# Patient Record
Sex: FEMALE | Race: WHITE | NOT HISPANIC OR LATINO | Employment: OTHER | URBAN - METROPOLITAN AREA
[De-identification: names, ages, dates, MRNs, and addresses within clinical notes are randomized per-mention and may not be internally consistent; named-entity substitution may affect disease eponyms.]

---

## 2017-01-10 ENCOUNTER — HOSPITAL ENCOUNTER (INPATIENT)
Facility: HOSPITAL | Age: 82
LOS: 9 days | Discharge: NURSING, REHAB OR LONG-TERM CARE | DRG: 871 | End: 2017-01-19
Attending: EMERGENCY MEDICINE | Admitting: INTERNAL MEDICINE
Payer: MEDICARE

## 2017-01-10 ENCOUNTER — APPOINTMENT (EMERGENCY)
Dept: RADIOLOGY | Facility: HOSPITAL | Age: 82
DRG: 871 | End: 2017-01-10
Payer: MEDICARE

## 2017-01-10 DIAGNOSIS — G93.40 ENCEPHALOPATHY: Primary | ICD-10-CM

## 2017-01-10 DIAGNOSIS — A04.72 C. DIFFICILE COLITIS: ICD-10-CM

## 2017-01-10 DIAGNOSIS — F32.A DEPRESSION: Chronic | ICD-10-CM

## 2017-01-10 DIAGNOSIS — R19.7 DIARRHEA: ICD-10-CM

## 2017-01-10 DIAGNOSIS — N17.9 AKI (ACUTE KIDNEY INJURY) (HCC): ICD-10-CM

## 2017-01-10 DIAGNOSIS — R13.10 DYSPHAGIA: ICD-10-CM

## 2017-01-10 DIAGNOSIS — A04.72 C. DIFFICILE DIARRHEA: ICD-10-CM

## 2017-01-10 DIAGNOSIS — F03.90 DEMENTIA (HCC): ICD-10-CM

## 2017-01-10 LAB
ALBUMIN SERPL BCP-MCNC: 2.3 G/DL (ref 3.5–5)
ALP SERPL-CCNC: 71 U/L (ref 46–116)
ALT SERPL W P-5'-P-CCNC: 29 U/L (ref 12–78)
ANION GAP SERPL CALCULATED.3IONS-SCNC: 8 MMOL/L (ref 4–13)
AST SERPL W P-5'-P-CCNC: 45 U/L (ref 5–45)
ATRIAL RATE: 178 BPM
BACTERIA UR QL AUTO: ABNORMAL /HPF
BASE EX.OXY STD BLDV CALC-SCNC: 41.8 % (ref 60–80)
BASE EXCESS BLDV CALC-SCNC: 1.6 MMOL/L
BASOPHILS # BLD AUTO: 0.02 THOUSANDS/ΜL (ref 0–0.1)
BASOPHILS NFR BLD AUTO: 0 % (ref 0–1)
BILIRUB SERPL-MCNC: 0.28 MG/DL (ref 0.2–1)
BILIRUB UR QL STRIP: NEGATIVE
BUN SERPL-MCNC: 32 MG/DL (ref 5–25)
CALCIUM SERPL-MCNC: 9.1 MG/DL (ref 8.3–10.1)
CHLORIDE SERPL-SCNC: 97 MMOL/L (ref 100–108)
CLARITY UR: CLEAR
CLARITY, POC: CLEAR
CO2 SERPL-SCNC: 28 MMOL/L (ref 21–32)
COLOR UR: YELLOW
COLOR, POC: YELLOW
CREAT SERPL-MCNC: 1.29 MG/DL (ref 0.6–1.3)
EOSINOPHIL # BLD AUTO: 0 THOUSAND/ΜL (ref 0–0.61)
EOSINOPHIL NFR BLD AUTO: 0 % (ref 0–6)
ERYTHROCYTE [DISTWIDTH] IN BLOOD BY AUTOMATED COUNT: 15.5 % (ref 11.6–15.1)
GFR SERPL CREATININE-BSD FRML MDRD: 38.9 ML/MIN/1.73SQ M
GLUCOSE SERPL-MCNC: 112 MG/DL (ref 65–140)
GLUCOSE UR STRIP-MCNC: NEGATIVE MG/DL
HCO3 BLDV-SCNC: 27.1 MMOL/L (ref 24–30)
HCT VFR BLD AUTO: 32.4 % (ref 34.8–46.1)
HGB BLD-MCNC: 10.8 G/DL (ref 11.5–15.4)
HGB UR QL STRIP.AUTO: ABNORMAL
HYALINE CASTS #/AREA URNS LPF: ABNORMAL /LPF
KETONES UR STRIP-MCNC: NEGATIVE MG/DL
LACTATE SERPL-SCNC: 1.4 MMOL/L (ref 0.5–2)
LEUKOCYTE ESTERASE UR QL STRIP: ABNORMAL
LIPASE SERPL-CCNC: 58 U/L (ref 73–393)
LYMPHOCYTES # BLD AUTO: 0.92 THOUSANDS/ΜL (ref 0.6–4.47)
LYMPHOCYTES NFR BLD AUTO: 5 % (ref 14–44)
MCH RBC QN AUTO: 30.5 PG (ref 26.8–34.3)
MCHC RBC AUTO-ENTMCNC: 33.3 G/DL (ref 31.4–37.4)
MCV RBC AUTO: 92 FL (ref 82–98)
MONOCYTES # BLD AUTO: 1.34 THOUSAND/ΜL (ref 0.17–1.22)
MONOCYTES NFR BLD AUTO: 7 % (ref 4–12)
NEUTROPHILS # BLD AUTO: 15.83 THOUSANDS/ΜL (ref 1.85–7.62)
NEUTS SEG NFR BLD AUTO: 88 % (ref 43–75)
NITRITE UR QL STRIP: POSITIVE
NON-SQ EPI CELLS URNS QL MICRO: ABNORMAL /HPF
NRBC BLD AUTO-RTO: 0 /100 WBCS
O2 CT BLDV-SCNC: 6.8 ML/DL
PCO2 BLDV: 46.2 MM HG (ref 42–50)
PH BLDV: 7.39 [PH] (ref 7.3–7.4)
PH UR STRIP.AUTO: 5.5 [PH] (ref 4.5–8)
PLATELET # BLD AUTO: 172 THOUSANDS/UL (ref 149–390)
PMV BLD AUTO: 12.1 FL (ref 8.9–12.7)
PO2 BLDV: 26.1 MM HG (ref 35–45)
POTASSIUM SERPL-SCNC: 4.3 MMOL/L (ref 3.5–5.3)
PROT SERPL-MCNC: 7.4 G/DL (ref 6.4–8.2)
PROT UR STRIP-MCNC: ABNORMAL MG/DL
QRS AXIS: -13 DEGREES
QRSD INTERVAL: 86 MS
QT INTERVAL: 408 MS
QTC INTERVAL: 446 MS
RBC # BLD AUTO: 3.54 MILLION/UL (ref 3.81–5.12)
RBC #/AREA URNS AUTO: ABNORMAL /HPF
SODIUM SERPL-SCNC: 133 MMOL/L (ref 136–145)
SP GR UR STRIP.AUTO: 1.02 (ref 1–1.03)
SPECIMEN SOURCE: NORMAL
T WAVE AXIS: 49 DEGREES
TROPONIN I BLD-MCNC: 0 NG/ML (ref 0–0.08)
UROBILINOGEN UR QL STRIP.AUTO: 0.2 E.U./DL
VENTRICULAR RATE: 72 BPM
WBC # BLD AUTO: 18.24 THOUSAND/UL (ref 4.31–10.16)
WBC #/AREA URNS AUTO: ABNORMAL /HPF

## 2017-01-10 PROCEDURE — 87086 URINE CULTURE/COLONY COUNT: CPT

## 2017-01-10 PROCEDURE — 74000 HB X-RAY EXAM OF ABDOMEN (SINGLE ANTEROPOSTERIOR VIEW): CPT

## 2017-01-10 PROCEDURE — 83605 ASSAY OF LACTIC ACID: CPT | Performed by: EMERGENCY MEDICINE

## 2017-01-10 PROCEDURE — 96360 HYDRATION IV INFUSION INIT: CPT

## 2017-01-10 PROCEDURE — 81001 URINALYSIS AUTO W/SCOPE: CPT

## 2017-01-10 PROCEDURE — C9113 INJ PANTOPRAZOLE SODIUM, VIA: HCPCS | Performed by: PHYSICIAN ASSISTANT

## 2017-01-10 PROCEDURE — 74176 CT ABD & PELVIS W/O CONTRAST: CPT

## 2017-01-10 PROCEDURE — 70450 CT HEAD/BRAIN W/O DYE: CPT

## 2017-01-10 PROCEDURE — 85025 COMPLETE CBC W/AUTO DIFF WBC: CPT | Performed by: EMERGENCY MEDICINE

## 2017-01-10 PROCEDURE — 87077 CULTURE AEROBIC IDENTIFY: CPT

## 2017-01-10 PROCEDURE — 82805 BLOOD GASES W/O2 SATURATION: CPT | Performed by: EMERGENCY MEDICINE

## 2017-01-10 PROCEDURE — 80053 COMPREHEN METABOLIC PANEL: CPT | Performed by: EMERGENCY MEDICINE

## 2017-01-10 PROCEDURE — 83690 ASSAY OF LIPASE: CPT | Performed by: EMERGENCY MEDICINE

## 2017-01-10 PROCEDURE — 81002 URINALYSIS NONAUTO W/O SCOPE: CPT | Performed by: EMERGENCY MEDICINE

## 2017-01-10 PROCEDURE — 96361 HYDRATE IV INFUSION ADD-ON: CPT

## 2017-01-10 PROCEDURE — 99285 EMERGENCY DEPT VISIT HI MDM: CPT

## 2017-01-10 PROCEDURE — 93005 ELECTROCARDIOGRAM TRACING: CPT

## 2017-01-10 PROCEDURE — 96372 THER/PROPH/DIAG INJ SC/IM: CPT

## 2017-01-10 PROCEDURE — 84484 ASSAY OF TROPONIN QUANT: CPT

## 2017-01-10 PROCEDURE — 36415 COLL VENOUS BLD VENIPUNCTURE: CPT | Performed by: EMERGENCY MEDICINE

## 2017-01-10 PROCEDURE — 87186 SC STD MICRODIL/AGAR DIL: CPT

## 2017-01-10 RX ORDER — CARVEDILOL 12.5 MG/1
12.5 TABLET ORAL 2 TIMES DAILY WITH MEALS
Status: DISCONTINUED | OUTPATIENT
Start: 2017-01-11 | End: 2017-01-19 | Stop reason: HOSPADM

## 2017-01-10 RX ORDER — BRIMONIDINE TARTRATE 0.15 %
1 DROPS OPHTHALMIC (EYE) 2 TIMES DAILY
Status: DISCONTINUED | OUTPATIENT
Start: 2017-01-10 | End: 2017-01-19 | Stop reason: HOSPADM

## 2017-01-10 RX ORDER — OMEPRAZOLE 20 MG/1
20 CAPSULE, DELAYED RELEASE ORAL DAILY
COMMUNITY

## 2017-01-10 RX ORDER — CLOPIDOGREL BISULFATE 75 MG/1
75 TABLET ORAL DAILY
Status: DISCONTINUED | OUTPATIENT
Start: 2017-01-11 | End: 2017-01-19 | Stop reason: HOSPADM

## 2017-01-10 RX ORDER — HALOPERIDOL 5 MG/ML
2 INJECTION INTRAMUSCULAR ONCE
Status: COMPLETED | OUTPATIENT
Start: 2017-01-10 | End: 2017-01-10

## 2017-01-10 RX ORDER — LEVOTHYROXINE SODIUM 0.07 MG/1
75 TABLET ORAL
Status: DISCONTINUED | OUTPATIENT
Start: 2017-01-11 | End: 2017-01-19 | Stop reason: HOSPADM

## 2017-01-10 RX ORDER — QUETIAPINE FUMARATE 25 MG/1
12.5 TABLET, FILM COATED ORAL
COMMUNITY

## 2017-01-10 RX ORDER — PRAVASTATIN SODIUM 80 MG/1
80 TABLET ORAL
Status: DISCONTINUED | OUTPATIENT
Start: 2017-01-11 | End: 2017-01-19 | Stop reason: HOSPADM

## 2017-01-10 RX ORDER — LACTOSE-REDUCED FOOD/FIBER 0.06 G-1.5
LIQUID (ML) ORAL
COMMUNITY

## 2017-01-10 RX ORDER — QUETIAPINE FUMARATE 25 MG/1
12.5 TABLET, FILM COATED ORAL
Status: DISCONTINUED | OUTPATIENT
Start: 2017-01-10 | End: 2017-01-19 | Stop reason: HOSPADM

## 2017-01-10 RX ORDER — VALPROIC ACID 250 MG/5ML
2.5 SOLUTION ORAL 2 TIMES DAILY
COMMUNITY

## 2017-01-10 RX ORDER — GABAPENTIN 100 MG/1
200 CAPSULE ORAL 2 TIMES DAILY
Status: DISCONTINUED | OUTPATIENT
Start: 2017-01-10 | End: 2017-01-19 | Stop reason: HOSPADM

## 2017-01-10 RX ORDER — ACETAMINOPHEN 160 MG/5ML
650 SUSPENSION, ORAL (FINAL DOSE FORM) ORAL EVERY 4 HOURS PRN
Status: DISCONTINUED | OUTPATIENT
Start: 2017-01-10 | End: 2017-01-19 | Stop reason: HOSPADM

## 2017-01-10 RX ORDER — AMLODIPINE BESYLATE 5 MG/1
5 TABLET ORAL DAILY
Status: DISCONTINUED | OUTPATIENT
Start: 2017-01-11 | End: 2017-01-19 | Stop reason: HOSPADM

## 2017-01-10 RX ORDER — HALOPERIDOL 5 MG/ML
1 INJECTION INTRAMUSCULAR EVERY 6 HOURS PRN
Status: DISCONTINUED | OUTPATIENT
Start: 2017-01-10 | End: 2017-01-11

## 2017-01-10 RX ORDER — LORATADINE 10 MG/1
5 TABLET ORAL DAILY
Status: DISCONTINUED | OUTPATIENT
Start: 2017-01-11 | End: 2017-01-19 | Stop reason: HOSPADM

## 2017-01-10 RX ORDER — PRAVASTATIN SODIUM 80 MG/1
80 TABLET ORAL
Status: DISCONTINUED | OUTPATIENT
Start: 2017-01-11 | End: 2017-01-10

## 2017-01-10 RX ORDER — PANTOPRAZOLE SODIUM 40 MG/1
40 INJECTION, POWDER, FOR SOLUTION INTRAVENOUS EVERY 12 HOURS SCHEDULED
Status: DISCONTINUED | OUTPATIENT
Start: 2017-01-10 | End: 2017-01-19 | Stop reason: HOSPADM

## 2017-01-10 RX ORDER — ONDANSETRON 2 MG/ML
4 INJECTION INTRAMUSCULAR; INTRAVENOUS EVERY 6 HOURS PRN
Status: DISCONTINUED | OUTPATIENT
Start: 2017-01-10 | End: 2017-01-19 | Stop reason: HOSPADM

## 2017-01-10 RX ORDER — ESCITALOPRAM OXALATE 10 MG/1
10 TABLET ORAL DAILY
Status: DISCONTINUED | OUTPATIENT
Start: 2017-01-11 | End: 2017-01-19 | Stop reason: HOSPADM

## 2017-01-10 RX ADMIN — HALOPERIDOL LACTATE 2 MG: 5 INJECTION, SOLUTION INTRAMUSCULAR at 12:43

## 2017-01-10 RX ADMIN — PANTOPRAZOLE SODIUM 40 MG: 40 INJECTION, POWDER, FOR SOLUTION INTRAVENOUS at 20:46

## 2017-01-10 RX ADMIN — VANCOMYCIN HYDROCHLORIDE 125 MG: 500 INJECTION, POWDER, LYOPHILIZED, FOR SOLUTION INTRAVENOUS at 23:36

## 2017-01-10 RX ADMIN — HALOPERIDOL LACTATE 2 MG: 5 INJECTION, SOLUTION INTRAMUSCULAR at 13:30

## 2017-01-10 RX ADMIN — SODIUM CHLORIDE 1000 ML: 0.9 INJECTION, SOLUTION INTRAVENOUS at 12:20

## 2017-01-10 RX ADMIN — SODIUM CHLORIDE 500 ML: 0.9 INJECTION, SOLUTION INTRAVENOUS at 19:00

## 2017-01-10 RX ADMIN — VANCOMYCIN HYDROCHLORIDE 125 MG: 500 INJECTION, POWDER, LYOPHILIZED, FOR SOLUTION INTRAVENOUS at 13:11

## 2017-01-10 RX ADMIN — QUETIAPINE FUMARATE 12.5 MG: 25 TABLET, FILM COATED ORAL at 23:36

## 2017-01-10 RX ADMIN — CEFTRIAXONE 1000 MG: 1 INJECTION, POWDER, FOR SOLUTION INTRAMUSCULAR; INTRAVENOUS at 20:46

## 2017-01-10 RX ADMIN — VANCOMYCIN HYDROCHLORIDE 125 MG: 500 INJECTION, POWDER, LYOPHILIZED, FOR SOLUTION INTRAVENOUS at 19:00

## 2017-01-11 ENCOUNTER — APPOINTMENT (INPATIENT)
Dept: RADIOLOGY | Facility: HOSPITAL | Age: 82
DRG: 871 | End: 2017-01-11
Attending: HOSPITALIST
Payer: MEDICARE

## 2017-01-11 LAB
ALBUMIN SERPL BCP-MCNC: 2.1 G/DL (ref 3.5–5)
ALP SERPL-CCNC: 75 U/L (ref 46–116)
ALT SERPL W P-5'-P-CCNC: 23 U/L (ref 12–78)
ANION GAP SERPL CALCULATED.3IONS-SCNC: 6 MMOL/L (ref 4–13)
AST SERPL W P-5'-P-CCNC: 25 U/L (ref 5–45)
BILIRUB SERPL-MCNC: 0.23 MG/DL (ref 0.2–1)
BUN SERPL-MCNC: 25 MG/DL (ref 5–25)
CALCIUM SERPL-MCNC: 8.6 MG/DL (ref 8.3–10.1)
CHLORIDE SERPL-SCNC: 100 MMOL/L (ref 100–108)
CO2 SERPL-SCNC: 29 MMOL/L (ref 21–32)
CREAT SERPL-MCNC: 0.71 MG/DL (ref 0.6–1.3)
ERYTHROCYTE [DISTWIDTH] IN BLOOD BY AUTOMATED COUNT: 15.6 % (ref 11.6–15.1)
GFR SERPL CREATININE-BSD FRML MDRD: >60 ML/MIN/1.73SQ M
GLUCOSE SERPL-MCNC: 91 MG/DL (ref 65–140)
HCT VFR BLD AUTO: 31 % (ref 34.8–46.1)
HGB BLD-MCNC: 9.9 G/DL (ref 11.5–15.4)
MAGNESIUM SERPL-MCNC: 2.3 MG/DL (ref 1.6–2.6)
MCH RBC QN AUTO: 29.3 PG (ref 26.8–34.3)
MCHC RBC AUTO-ENTMCNC: 31.9 G/DL (ref 31.4–37.4)
MCV RBC AUTO: 92 FL (ref 82–98)
PLATELET # BLD AUTO: 164 THOUSANDS/UL (ref 149–390)
PMV BLD AUTO: 12.8 FL (ref 8.9–12.7)
POTASSIUM SERPL-SCNC: 3.5 MMOL/L (ref 3.5–5.3)
PROT SERPL-MCNC: 6.7 G/DL (ref 6.4–8.2)
RBC # BLD AUTO: 3.38 MILLION/UL (ref 3.81–5.12)
SODIUM SERPL-SCNC: 135 MMOL/L (ref 136–145)
WBC # BLD AUTO: 15.9 THOUSAND/UL (ref 4.31–10.16)

## 2017-01-11 PROCEDURE — C9113 INJ PANTOPRAZOLE SODIUM, VIA: HCPCS | Performed by: PHYSICIAN ASSISTANT

## 2017-01-11 PROCEDURE — 85027 COMPLETE CBC AUTOMATED: CPT | Performed by: PHYSICIAN ASSISTANT

## 2017-01-11 PROCEDURE — 94760 N-INVAS EAR/PLS OXIMETRY 1: CPT

## 2017-01-11 PROCEDURE — 80053 COMPREHEN METABOLIC PANEL: CPT | Performed by: PHYSICIAN ASSISTANT

## 2017-01-11 PROCEDURE — 71010 HB CHEST X-RAY 1 VIEW FRONTAL (PORTABLE): CPT

## 2017-01-11 PROCEDURE — 83735 ASSAY OF MAGNESIUM: CPT | Performed by: PHYSICIAN ASSISTANT

## 2017-01-11 RX ORDER — SODIUM CHLORIDE FOR INHALATION 0.9 %
3 VIAL, NEBULIZER (ML) INHALATION EVERY 4 HOURS PRN
Status: DISCONTINUED | OUTPATIENT
Start: 2017-01-11 | End: 2017-01-19 | Stop reason: HOSPADM

## 2017-01-11 RX ORDER — IPRATROPIUM BROMIDE AND ALBUTEROL SULFATE 2.5; .5 MG/3ML; MG/3ML
3 SOLUTION RESPIRATORY (INHALATION) 3 TIMES DAILY PRN
Status: DISCONTINUED | OUTPATIENT
Start: 2017-01-11 | End: 2017-01-11

## 2017-01-11 RX ADMIN — PANTOPRAZOLE SODIUM 40 MG: 40 INJECTION, POWDER, FOR SOLUTION INTRAVENOUS at 22:00

## 2017-01-11 RX ADMIN — BIMATOPROST 1 DROP: 0.1 SOLUTION/ DROPS OPHTHALMIC at 22:13

## 2017-01-11 RX ADMIN — CARVEDILOL 12.5 MG: 12.5 TABLET, FILM COATED ORAL at 08:15

## 2017-01-11 RX ADMIN — CLOPIDOGREL BISULFATE 75 MG: 75 TABLET ORAL at 08:15

## 2017-01-11 RX ADMIN — ESCITALOPRAM OXALATE 10 MG: 10 TABLET ORAL at 08:17

## 2017-01-11 RX ADMIN — VALPROIC ACID 125 MG: 250 SOLUTION ORAL at 11:32

## 2017-01-11 RX ADMIN — TIOTROPIUM BROMIDE 18 MCG: 18 CAPSULE ORAL; RESPIRATORY (INHALATION) at 08:18

## 2017-01-11 RX ADMIN — AMLODIPINE BESYLATE 5 MG: 5 TABLET ORAL at 08:15

## 2017-01-11 RX ADMIN — VALPROIC ACID 125 MG: 250 SOLUTION ORAL at 22:00

## 2017-01-11 RX ADMIN — CARVEDILOL 12.5 MG: 12.5 TABLET, FILM COATED ORAL at 17:50

## 2017-01-11 RX ADMIN — VANCOMYCIN HYDROCHLORIDE 125 MG: 500 INJECTION, POWDER, LYOPHILIZED, FOR SOLUTION INTRAVENOUS at 05:51

## 2017-01-11 RX ADMIN — GABAPENTIN 200 MG: 100 CAPSULE ORAL at 08:15

## 2017-01-11 RX ADMIN — BRIMONIDINE TARTRATE 1 DROP: 1.5 SOLUTION OPHTHALMIC at 17:49

## 2017-01-11 RX ADMIN — LORATADINE 5 MG: 10 TABLET ORAL at 08:15

## 2017-01-11 RX ADMIN — PANTOPRAZOLE SODIUM 40 MG: 40 INJECTION, POWDER, FOR SOLUTION INTRAVENOUS at 08:15

## 2017-01-11 RX ADMIN — BRIMONIDINE TARTRATE 1 DROP: 1.5 SOLUTION OPHTHALMIC at 08:18

## 2017-01-11 RX ADMIN — VANCOMYCIN HYDROCHLORIDE 125 MG: 500 INJECTION, POWDER, LYOPHILIZED, FOR SOLUTION INTRAVENOUS at 17:50

## 2017-01-11 RX ADMIN — PRAVASTATIN SODIUM 80 MG: 80 TABLET ORAL at 17:50

## 2017-01-11 RX ADMIN — QUETIAPINE FUMARATE 12.5 MG: 25 TABLET, FILM COATED ORAL at 22:04

## 2017-01-11 RX ADMIN — VANCOMYCIN HYDROCHLORIDE 125 MG: 500 INJECTION, POWDER, LYOPHILIZED, FOR SOLUTION INTRAVENOUS at 11:31

## 2017-01-11 RX ADMIN — GABAPENTIN 200 MG: 100 CAPSULE ORAL at 17:50

## 2017-01-11 RX ADMIN — LEVOTHYROXINE SODIUM 75 MCG: 75 TABLET ORAL at 05:51

## 2017-01-12 LAB — BACTERIA UR CULT: NORMAL

## 2017-01-12 PROCEDURE — G8979 MOBILITY GOAL STATUS: HCPCS

## 2017-01-12 PROCEDURE — C9113 INJ PANTOPRAZOLE SODIUM, VIA: HCPCS | Performed by: PHYSICIAN ASSISTANT

## 2017-01-12 PROCEDURE — 97162 PT EVAL MOD COMPLEX 30 MIN: CPT

## 2017-01-12 PROCEDURE — G8980 MOBILITY D/C STATUS: HCPCS

## 2017-01-12 PROCEDURE — 92610 EVALUATE SWALLOWING FUNCTION: CPT

## 2017-01-12 PROCEDURE — G8978 MOBILITY CURRENT STATUS: HCPCS

## 2017-01-12 PROCEDURE — 94760 N-INVAS EAR/PLS OXIMETRY 1: CPT

## 2017-01-12 RX ORDER — CEPHALEXIN 500 MG/1
500 CAPSULE ORAL EVERY 12 HOURS SCHEDULED
Status: DISCONTINUED | OUTPATIENT
Start: 2017-01-12 | End: 2017-01-14

## 2017-01-12 RX ADMIN — BRIMONIDINE TARTRATE 1 DROP: 1.5 SOLUTION OPHTHALMIC at 17:37

## 2017-01-12 RX ADMIN — TIOTROPIUM BROMIDE 18 MCG: 18 CAPSULE ORAL; RESPIRATORY (INHALATION) at 09:58

## 2017-01-12 RX ADMIN — QUETIAPINE FUMARATE 12.5 MG: 25 TABLET, FILM COATED ORAL at 22:21

## 2017-01-12 RX ADMIN — VANCOMYCIN HYDROCHLORIDE 125 MG: 500 INJECTION, POWDER, LYOPHILIZED, FOR SOLUTION INTRAVENOUS at 17:19

## 2017-01-12 RX ADMIN — BIMATOPROST 1 DROP: 0.1 SOLUTION/ DROPS OPHTHALMIC at 22:28

## 2017-01-12 RX ADMIN — BRIMONIDINE TARTRATE 1 DROP: 1.5 SOLUTION OPHTHALMIC at 09:58

## 2017-01-12 RX ADMIN — PRAVASTATIN SODIUM 80 MG: 80 TABLET ORAL at 17:19

## 2017-01-12 RX ADMIN — ESCITALOPRAM OXALATE 10 MG: 10 TABLET ORAL at 09:56

## 2017-01-12 RX ADMIN — LORATADINE 5 MG: 10 TABLET ORAL at 09:57

## 2017-01-12 RX ADMIN — PANTOPRAZOLE SODIUM 40 MG: 40 INJECTION, POWDER, FOR SOLUTION INTRAVENOUS at 09:58

## 2017-01-12 RX ADMIN — VANCOMYCIN HYDROCHLORIDE 125 MG: 500 INJECTION, POWDER, LYOPHILIZED, FOR SOLUTION INTRAVENOUS at 13:53

## 2017-01-12 RX ADMIN — GABAPENTIN 200 MG: 100 CAPSULE ORAL at 17:19

## 2017-01-12 RX ADMIN — CARVEDILOL 12.5 MG: 12.5 TABLET, FILM COATED ORAL at 06:31

## 2017-01-12 RX ADMIN — CLOPIDOGREL BISULFATE 75 MG: 75 TABLET ORAL at 09:57

## 2017-01-12 RX ADMIN — VALPROIC ACID 125 MG: 250 SOLUTION ORAL at 09:58

## 2017-01-12 RX ADMIN — AMLODIPINE BESYLATE 5 MG: 5 TABLET ORAL at 09:56

## 2017-01-12 RX ADMIN — PANTOPRAZOLE SODIUM 40 MG: 40 INJECTION, POWDER, FOR SOLUTION INTRAVENOUS at 22:21

## 2017-01-12 RX ADMIN — VALPROIC ACID 125 MG: 250 SOLUTION ORAL at 22:22

## 2017-01-12 RX ADMIN — CARVEDILOL 12.5 MG: 12.5 TABLET, FILM COATED ORAL at 17:19

## 2017-01-12 RX ADMIN — VANCOMYCIN HYDROCHLORIDE 125 MG: 500 INJECTION, POWDER, LYOPHILIZED, FOR SOLUTION INTRAVENOUS at 06:31

## 2017-01-12 RX ADMIN — CEPHALEXIN 500 MG: 500 CAPSULE ORAL at 09:57

## 2017-01-12 RX ADMIN — LEVOTHYROXINE SODIUM 75 MCG: 75 TABLET ORAL at 06:31

## 2017-01-12 RX ADMIN — CEPHALEXIN 500 MG: 500 CAPSULE ORAL at 22:21

## 2017-01-12 RX ADMIN — VANCOMYCIN HYDROCHLORIDE 125 MG: 500 INJECTION, POWDER, LYOPHILIZED, FOR SOLUTION INTRAVENOUS at 01:09

## 2017-01-12 RX ADMIN — GABAPENTIN 200 MG: 100 CAPSULE ORAL at 09:56

## 2017-01-13 LAB
ALBUMIN SERPL BCP-MCNC: 2.5 G/DL (ref 3.5–5)
ALP SERPL-CCNC: 91 U/L (ref 46–116)
ALT SERPL W P-5'-P-CCNC: 37 U/L (ref 12–78)
ANION GAP SERPL CALCULATED.3IONS-SCNC: 7 MMOL/L (ref 4–13)
AST SERPL W P-5'-P-CCNC: 34 U/L (ref 5–45)
BILIRUB SERPL-MCNC: 0.28 MG/DL (ref 0.2–1)
BUN SERPL-MCNC: 26 MG/DL (ref 5–25)
CALCIUM SERPL-MCNC: 9.4 MG/DL (ref 8.3–10.1)
CHLORIDE SERPL-SCNC: 109 MMOL/L (ref 100–108)
CO2 SERPL-SCNC: 30 MMOL/L (ref 21–32)
CREAT SERPL-MCNC: 0.81 MG/DL (ref 0.6–1.3)
ERYTHROCYTE [DISTWIDTH] IN BLOOD BY AUTOMATED COUNT: 16.4 % (ref 11.6–15.1)
GFR SERPL CREATININE-BSD FRML MDRD: >60 ML/MIN/1.73SQ M
GLUCOSE SERPL-MCNC: 103 MG/DL (ref 65–140)
HCT VFR BLD AUTO: 32.5 % (ref 34.8–46.1)
HGB BLD-MCNC: 10.5 G/DL (ref 11.5–15.4)
MAGNESIUM SERPL-MCNC: 2.6 MG/DL (ref 1.6–2.6)
MCH RBC QN AUTO: 30.3 PG (ref 26.8–34.3)
MCHC RBC AUTO-ENTMCNC: 32.3 G/DL (ref 31.4–37.4)
MCV RBC AUTO: 94 FL (ref 82–98)
PLATELET # BLD AUTO: 220 THOUSANDS/UL (ref 149–390)
PMV BLD AUTO: 12.6 FL (ref 8.9–12.7)
POTASSIUM SERPL-SCNC: 3.4 MMOL/L (ref 3.5–5.3)
PROT SERPL-MCNC: 7.5 G/DL (ref 6.4–8.2)
RBC # BLD AUTO: 3.46 MILLION/UL (ref 3.81–5.12)
SODIUM SERPL-SCNC: 146 MMOL/L (ref 136–145)
WBC # BLD AUTO: 9.48 THOUSAND/UL (ref 4.31–10.16)

## 2017-01-13 PROCEDURE — 85027 COMPLETE CBC AUTOMATED: CPT | Performed by: PHYSICIAN ASSISTANT

## 2017-01-13 PROCEDURE — 92526 ORAL FUNCTION THERAPY: CPT

## 2017-01-13 PROCEDURE — 80053 COMPREHEN METABOLIC PANEL: CPT | Performed by: PHYSICIAN ASSISTANT

## 2017-01-13 PROCEDURE — 83735 ASSAY OF MAGNESIUM: CPT | Performed by: PHYSICIAN ASSISTANT

## 2017-01-13 PROCEDURE — C9113 INJ PANTOPRAZOLE SODIUM, VIA: HCPCS | Performed by: PHYSICIAN ASSISTANT

## 2017-01-13 RX ADMIN — VALPROIC ACID 125 MG: 250 SOLUTION ORAL at 23:33

## 2017-01-13 RX ADMIN — CLOPIDOGREL BISULFATE 75 MG: 75 TABLET ORAL at 09:42

## 2017-01-13 RX ADMIN — PANTOPRAZOLE SODIUM 40 MG: 40 INJECTION, POWDER, FOR SOLUTION INTRAVENOUS at 09:42

## 2017-01-13 RX ADMIN — VALPROIC ACID 125 MG: 250 SOLUTION ORAL at 09:44

## 2017-01-13 RX ADMIN — ESCITALOPRAM OXALATE 10 MG: 10 TABLET ORAL at 09:43

## 2017-01-13 RX ADMIN — BRIMONIDINE TARTRATE 1 DROP: 1.5 SOLUTION OPHTHALMIC at 09:43

## 2017-01-13 RX ADMIN — LEVOTHYROXINE SODIUM 75 MCG: 75 TABLET ORAL at 06:18

## 2017-01-13 RX ADMIN — GABAPENTIN 200 MG: 100 CAPSULE ORAL at 16:47

## 2017-01-13 RX ADMIN — AMLODIPINE BESYLATE 5 MG: 5 TABLET ORAL at 09:43

## 2017-01-13 RX ADMIN — BIMATOPROST 1 DROP: 0.1 SOLUTION/ DROPS OPHTHALMIC at 23:32

## 2017-01-13 RX ADMIN — BRIMONIDINE TARTRATE 1 DROP: 1.5 SOLUTION OPHTHALMIC at 16:47

## 2017-01-13 RX ADMIN — CARVEDILOL 12.5 MG: 12.5 TABLET, FILM COATED ORAL at 16:47

## 2017-01-13 RX ADMIN — VANCOMYCIN HYDROCHLORIDE 125 MG: 500 INJECTION, POWDER, LYOPHILIZED, FOR SOLUTION INTRAVENOUS at 23:34

## 2017-01-13 RX ADMIN — VANCOMYCIN HYDROCHLORIDE 125 MG: 500 INJECTION, POWDER, LYOPHILIZED, FOR SOLUTION INTRAVENOUS at 16:47

## 2017-01-13 RX ADMIN — PRAVASTATIN SODIUM 80 MG: 80 TABLET ORAL at 16:47

## 2017-01-13 RX ADMIN — VANCOMYCIN HYDROCHLORIDE 125 MG: 500 INJECTION, POWDER, LYOPHILIZED, FOR SOLUTION INTRAVENOUS at 12:30

## 2017-01-13 RX ADMIN — QUETIAPINE FUMARATE 12.5 MG: 25 TABLET, FILM COATED ORAL at 21:49

## 2017-01-13 RX ADMIN — CEPHALEXIN 500 MG: 500 CAPSULE ORAL at 22:06

## 2017-01-13 RX ADMIN — VANCOMYCIN HYDROCHLORIDE 125 MG: 500 INJECTION, POWDER, LYOPHILIZED, FOR SOLUTION INTRAVENOUS at 06:18

## 2017-01-13 RX ADMIN — CARVEDILOL 12.5 MG: 12.5 TABLET, FILM COATED ORAL at 07:30

## 2017-01-13 RX ADMIN — VANCOMYCIN HYDROCHLORIDE 125 MG: 500 INJECTION, POWDER, LYOPHILIZED, FOR SOLUTION INTRAVENOUS at 00:54

## 2017-01-13 RX ADMIN — CEPHALEXIN 500 MG: 500 CAPSULE ORAL at 09:43

## 2017-01-13 RX ADMIN — LORATADINE 5 MG: 10 TABLET ORAL at 09:42

## 2017-01-13 RX ADMIN — GABAPENTIN 200 MG: 100 CAPSULE ORAL at 09:43

## 2017-01-13 RX ADMIN — PANTOPRAZOLE SODIUM 40 MG: 40 INJECTION, POWDER, FOR SOLUTION INTRAVENOUS at 22:06

## 2017-01-13 RX ADMIN — TIOTROPIUM BROMIDE 18 MCG: 18 CAPSULE ORAL; RESPIRATORY (INHALATION) at 09:43

## 2017-01-14 LAB
ALBUMIN SERPL BCP-MCNC: 2.5 G/DL (ref 3.5–5)
ALP SERPL-CCNC: 84 U/L (ref 46–116)
ALT SERPL W P-5'-P-CCNC: 41 U/L (ref 12–78)
ANION GAP SERPL CALCULATED.3IONS-SCNC: 9 MMOL/L (ref 4–13)
AST SERPL W P-5'-P-CCNC: 33 U/L (ref 5–45)
BASOPHILS # BLD AUTO: 0.01 THOUSANDS/ΜL (ref 0–0.1)
BASOPHILS NFR BLD AUTO: 0 % (ref 0–1)
BILIRUB SERPL-MCNC: 0.25 MG/DL (ref 0.2–1)
BUN SERPL-MCNC: 22 MG/DL (ref 5–25)
CALCIUM SERPL-MCNC: 9 MG/DL (ref 8.3–10.1)
CHLORIDE SERPL-SCNC: 109 MMOL/L (ref 100–108)
CO2 SERPL-SCNC: 31 MMOL/L (ref 21–32)
CREAT SERPL-MCNC: 0.76 MG/DL (ref 0.6–1.3)
EOSINOPHIL # BLD AUTO: 0.03 THOUSAND/ΜL (ref 0–0.61)
EOSINOPHIL NFR BLD AUTO: 0 % (ref 0–6)
ERYTHROCYTE [DISTWIDTH] IN BLOOD BY AUTOMATED COUNT: 16.4 % (ref 11.6–15.1)
GFR SERPL CREATININE-BSD FRML MDRD: >60 ML/MIN/1.73SQ M
GLUCOSE SERPL-MCNC: 100 MG/DL (ref 65–140)
HCT VFR BLD AUTO: 32.3 % (ref 34.8–46.1)
HGB BLD-MCNC: 10.1 G/DL (ref 11.5–15.4)
LYMPHOCYTES # BLD AUTO: 0.69 THOUSANDS/ΜL (ref 0.6–4.47)
LYMPHOCYTES NFR BLD AUTO: 6 % (ref 14–44)
MAGNESIUM SERPL-MCNC: 2.4 MG/DL (ref 1.6–2.6)
MCH RBC QN AUTO: 29.3 PG (ref 26.8–34.3)
MCHC RBC AUTO-ENTMCNC: 31.3 G/DL (ref 31.4–37.4)
MCV RBC AUTO: 94 FL (ref 82–98)
MONOCYTES # BLD AUTO: 0.73 THOUSAND/ΜL (ref 0.17–1.22)
MONOCYTES NFR BLD AUTO: 7 % (ref 4–12)
NEUTROPHILS # BLD AUTO: 9.55 THOUSANDS/ΜL (ref 1.85–7.62)
NEUTS SEG NFR BLD AUTO: 87 % (ref 43–75)
NRBC BLD AUTO-RTO: 0 /100 WBCS
PLATELET # BLD AUTO: 220 THOUSANDS/UL (ref 149–390)
PMV BLD AUTO: 11.7 FL (ref 8.9–12.7)
POTASSIUM SERPL-SCNC: 3.2 MMOL/L (ref 3.5–5.3)
PROT SERPL-MCNC: 7.6 G/DL (ref 6.4–8.2)
RBC # BLD AUTO: 3.45 MILLION/UL (ref 3.81–5.12)
SODIUM SERPL-SCNC: 149 MMOL/L (ref 136–145)
WBC # BLD AUTO: 11.2 THOUSAND/UL (ref 4.31–10.16)

## 2017-01-14 PROCEDURE — 85025 COMPLETE CBC W/AUTO DIFF WBC: CPT | Performed by: HOSPITALIST

## 2017-01-14 PROCEDURE — C9113 INJ PANTOPRAZOLE SODIUM, VIA: HCPCS | Performed by: PHYSICIAN ASSISTANT

## 2017-01-14 PROCEDURE — 83735 ASSAY OF MAGNESIUM: CPT | Performed by: HOSPITALIST

## 2017-01-14 PROCEDURE — 80053 COMPREHEN METABOLIC PANEL: CPT | Performed by: HOSPITALIST

## 2017-01-14 RX ORDER — CEPHALEXIN 500 MG/1
500 CAPSULE ORAL EVERY 12 HOURS SCHEDULED
Status: COMPLETED | OUTPATIENT
Start: 2017-01-14 | End: 2017-01-14

## 2017-01-14 RX ORDER — POTASSIUM CHLORIDE 20MEQ/15ML
20 LIQUID (ML) ORAL 2 TIMES DAILY
Status: DISCONTINUED | OUTPATIENT
Start: 2017-01-14 | End: 2017-01-15

## 2017-01-14 RX ADMIN — VANCOMYCIN HYDROCHLORIDE 125 MG: 500 INJECTION, POWDER, LYOPHILIZED, FOR SOLUTION INTRAVENOUS at 17:09

## 2017-01-14 RX ADMIN — VANCOMYCIN HYDROCHLORIDE 125 MG: 500 INJECTION, POWDER, LYOPHILIZED, FOR SOLUTION INTRAVENOUS at 06:19

## 2017-01-14 RX ADMIN — CARVEDILOL 12.5 MG: 12.5 TABLET, FILM COATED ORAL at 17:09

## 2017-01-14 RX ADMIN — VANCOMYCIN HYDROCHLORIDE 125 MG: 500 INJECTION, POWDER, LYOPHILIZED, FOR SOLUTION INTRAVENOUS at 23:22

## 2017-01-14 RX ADMIN — CLOPIDOGREL BISULFATE 75 MG: 75 TABLET ORAL at 09:50

## 2017-01-14 RX ADMIN — ACETAMINOPHEN 650 MG: 160 SUSPENSION ORAL at 10:18

## 2017-01-14 RX ADMIN — VALPROIC ACID 125 MG: 250 SOLUTION ORAL at 09:00

## 2017-01-14 RX ADMIN — ESCITALOPRAM OXALATE 10 MG: 10 TABLET ORAL at 09:50

## 2017-01-14 RX ADMIN — QUETIAPINE FUMARATE 12.5 MG: 25 TABLET, FILM COATED ORAL at 21:59

## 2017-01-14 RX ADMIN — PANTOPRAZOLE SODIUM 40 MG: 40 INJECTION, POWDER, FOR SOLUTION INTRAVENOUS at 09:50

## 2017-01-14 RX ADMIN — TIOTROPIUM BROMIDE 18 MCG: 18 CAPSULE ORAL; RESPIRATORY (INHALATION) at 09:00

## 2017-01-14 RX ADMIN — LORATADINE 5 MG: 10 TABLET ORAL at 09:50

## 2017-01-14 RX ADMIN — CEPHALEXIN 500 MG: 500 CAPSULE ORAL at 09:50

## 2017-01-14 RX ADMIN — GABAPENTIN 200 MG: 100 CAPSULE ORAL at 17:09

## 2017-01-14 RX ADMIN — PRAVASTATIN SODIUM 80 MG: 80 TABLET ORAL at 17:09

## 2017-01-14 RX ADMIN — LEVOTHYROXINE SODIUM 75 MCG: 75 TABLET ORAL at 06:19

## 2017-01-14 RX ADMIN — VALPROIC ACID 125 MG: 250 SOLUTION ORAL at 21:59

## 2017-01-14 RX ADMIN — PANTOPRAZOLE SODIUM 40 MG: 40 INJECTION, POWDER, FOR SOLUTION INTRAVENOUS at 21:59

## 2017-01-14 RX ADMIN — BRIMONIDINE TARTRATE 1 DROP: 1.5 SOLUTION OPHTHALMIC at 17:10

## 2017-01-14 RX ADMIN — CEPHALEXIN 500 MG: 500 CAPSULE ORAL at 22:00

## 2017-01-14 RX ADMIN — CARVEDILOL 12.5 MG: 12.5 TABLET, FILM COATED ORAL at 08:00

## 2017-01-14 RX ADMIN — BIMATOPROST 1 DROP: 0.1 SOLUTION/ DROPS OPHTHALMIC at 21:59

## 2017-01-14 RX ADMIN — VANCOMYCIN HYDROCHLORIDE 125 MG: 500 INJECTION, POWDER, LYOPHILIZED, FOR SOLUTION INTRAVENOUS at 13:00

## 2017-01-14 RX ADMIN — GABAPENTIN 200 MG: 100 CAPSULE ORAL at 09:50

## 2017-01-14 RX ADMIN — AMLODIPINE BESYLATE 5 MG: 5 TABLET ORAL at 09:50

## 2017-01-14 RX ADMIN — POTASSIUM CHLORIDE 20 MEQ: 20 SOLUTION ORAL at 17:09

## 2017-01-15 LAB
ANION GAP SERPL CALCULATED.3IONS-SCNC: 8 MMOL/L (ref 4–13)
BASOPHILS # BLD AUTO: 0.01 THOUSANDS/ΜL (ref 0–0.1)
BASOPHILS NFR BLD AUTO: 0 % (ref 0–1)
BUN SERPL-MCNC: 27 MG/DL (ref 5–25)
CALCIUM SERPL-MCNC: 9.2 MG/DL (ref 8.3–10.1)
CHLORIDE SERPL-SCNC: 107 MMOL/L (ref 100–108)
CO2 SERPL-SCNC: 28 MMOL/L (ref 21–32)
CREAT SERPL-MCNC: 0.89 MG/DL (ref 0.6–1.3)
EOSINOPHIL # BLD AUTO: 0.02 THOUSAND/ΜL (ref 0–0.61)
EOSINOPHIL NFR BLD AUTO: 0 % (ref 0–6)
ERYTHROCYTE [DISTWIDTH] IN BLOOD BY AUTOMATED COUNT: 16.5 % (ref 11.6–15.1)
GFR SERPL CREATININE-BSD FRML MDRD: 59.7 ML/MIN/1.73SQ M
GLUCOSE SERPL-MCNC: 156 MG/DL (ref 65–140)
HCT VFR BLD AUTO: 35.3 % (ref 34.8–46.1)
HGB BLD-MCNC: 11.3 G/DL (ref 11.5–15.4)
LYMPHOCYTES # BLD AUTO: 0.49 THOUSANDS/ΜL (ref 0.6–4.47)
LYMPHOCYTES NFR BLD AUTO: 5 % (ref 14–44)
MCH RBC QN AUTO: 30.3 PG (ref 26.8–34.3)
MCHC RBC AUTO-ENTMCNC: 32 G/DL (ref 31.4–37.4)
MCV RBC AUTO: 95 FL (ref 82–98)
MONOCYTES # BLD AUTO: 0.69 THOUSAND/ΜL (ref 0.17–1.22)
MONOCYTES NFR BLD AUTO: 7 % (ref 4–12)
NEUTROPHILS # BLD AUTO: 8.23 THOUSANDS/ΜL (ref 1.85–7.62)
NEUTS SEG NFR BLD AUTO: 88 % (ref 43–75)
NRBC BLD AUTO-RTO: 0 /100 WBCS
PLATELET # BLD AUTO: 232 THOUSANDS/UL (ref 149–390)
PMV BLD AUTO: 11.8 FL (ref 8.9–12.7)
POTASSIUM SERPL-SCNC: 3.1 MMOL/L (ref 3.5–5.3)
RBC # BLD AUTO: 3.73 MILLION/UL (ref 3.81–5.12)
SODIUM SERPL-SCNC: 143 MMOL/L (ref 136–145)
WBC # BLD AUTO: 9.6 THOUSAND/UL (ref 4.31–10.16)

## 2017-01-15 PROCEDURE — C9113 INJ PANTOPRAZOLE SODIUM, VIA: HCPCS | Performed by: PHYSICIAN ASSISTANT

## 2017-01-15 PROCEDURE — 80048 BASIC METABOLIC PNL TOTAL CA: CPT | Performed by: HOSPITALIST

## 2017-01-15 PROCEDURE — 85025 COMPLETE CBC W/AUTO DIFF WBC: CPT | Performed by: HOSPITALIST

## 2017-01-15 RX ORDER — POTASSIUM CHLORIDE 20MEQ/15ML
40 LIQUID (ML) ORAL 2 TIMES DAILY
Status: DISCONTINUED | OUTPATIENT
Start: 2017-01-15 | End: 2017-01-17

## 2017-01-15 RX ADMIN — PANTOPRAZOLE SODIUM 40 MG: 40 INJECTION, POWDER, FOR SOLUTION INTRAVENOUS at 22:31

## 2017-01-15 RX ADMIN — VALPROIC ACID 125 MG: 250 SOLUTION ORAL at 12:24

## 2017-01-15 RX ADMIN — CLOPIDOGREL BISULFATE 75 MG: 75 TABLET ORAL at 10:18

## 2017-01-15 RX ADMIN — LEVOTHYROXINE SODIUM 75 MCG: 75 TABLET ORAL at 05:50

## 2017-01-15 RX ADMIN — VANCOMYCIN HYDROCHLORIDE 125 MG: 500 INJECTION, POWDER, LYOPHILIZED, FOR SOLUTION INTRAVENOUS at 17:43

## 2017-01-15 RX ADMIN — QUETIAPINE FUMARATE 12.5 MG: 25 TABLET, FILM COATED ORAL at 22:32

## 2017-01-15 RX ADMIN — VANCOMYCIN HYDROCHLORIDE 125 MG: 500 INJECTION, POWDER, LYOPHILIZED, FOR SOLUTION INTRAVENOUS at 12:23

## 2017-01-15 RX ADMIN — POTASSIUM CHLORIDE 40 MEQ: 20 SOLUTION ORAL at 17:43

## 2017-01-15 RX ADMIN — VALPROIC ACID 125 MG: 250 SOLUTION ORAL at 22:32

## 2017-01-15 RX ADMIN — POTASSIUM CHLORIDE 20 MEQ: 20 SOLUTION ORAL at 12:23

## 2017-01-15 RX ADMIN — VANCOMYCIN HYDROCHLORIDE 125 MG: 500 INJECTION, POWDER, LYOPHILIZED, FOR SOLUTION INTRAVENOUS at 23:28

## 2017-01-15 RX ADMIN — PANTOPRAZOLE SODIUM 40 MG: 40 INJECTION, POWDER, FOR SOLUTION INTRAVENOUS at 10:17

## 2017-01-15 RX ADMIN — ESCITALOPRAM OXALATE 10 MG: 10 TABLET ORAL at 10:18

## 2017-01-15 RX ADMIN — BIMATOPROST 1 DROP: 0.1 SOLUTION/ DROPS OPHTHALMIC at 22:31

## 2017-01-15 RX ADMIN — TIOTROPIUM BROMIDE 18 MCG: 18 CAPSULE ORAL; RESPIRATORY (INHALATION) at 12:24

## 2017-01-15 RX ADMIN — AMLODIPINE BESYLATE 5 MG: 5 TABLET ORAL at 10:17

## 2017-01-15 RX ADMIN — LORATADINE 5 MG: 10 TABLET ORAL at 10:18

## 2017-01-15 RX ADMIN — GABAPENTIN 200 MG: 100 CAPSULE ORAL at 10:18

## 2017-01-15 RX ADMIN — VANCOMYCIN HYDROCHLORIDE 125 MG: 500 INJECTION, POWDER, LYOPHILIZED, FOR SOLUTION INTRAVENOUS at 05:50

## 2017-01-15 RX ADMIN — PRAVASTATIN SODIUM 80 MG: 80 TABLET ORAL at 17:43

## 2017-01-15 RX ADMIN — GABAPENTIN 200 MG: 100 CAPSULE ORAL at 17:43

## 2017-01-15 RX ADMIN — BRIMONIDINE TARTRATE 1 DROP: 1.5 SOLUTION OPHTHALMIC at 10:19

## 2017-01-15 RX ADMIN — BRIMONIDINE TARTRATE 1 DROP: 1.5 SOLUTION OPHTHALMIC at 17:44

## 2017-01-15 RX ADMIN — CARVEDILOL 12.5 MG: 12.5 TABLET, FILM COATED ORAL at 10:18

## 2017-01-15 RX ADMIN — CARVEDILOL 12.5 MG: 12.5 TABLET, FILM COATED ORAL at 17:43

## 2017-01-16 LAB
ANION GAP SERPL CALCULATED.3IONS-SCNC: 6 MMOL/L (ref 4–13)
BUN SERPL-MCNC: 26 MG/DL (ref 5–25)
CALCIUM SERPL-MCNC: 8.8 MG/DL (ref 8.3–10.1)
CHLORIDE SERPL-SCNC: 110 MMOL/L (ref 100–108)
CO2 SERPL-SCNC: 28 MMOL/L (ref 21–32)
CREAT SERPL-MCNC: 0.72 MG/DL (ref 0.6–1.3)
GFR SERPL CREATININE-BSD FRML MDRD: >60 ML/MIN/1.73SQ M
GLUCOSE SERPL-MCNC: 80 MG/DL (ref 65–140)
POTASSIUM SERPL-SCNC: 3.8 MMOL/L (ref 3.5–5.3)
SODIUM SERPL-SCNC: 144 MMOL/L (ref 136–145)

## 2017-01-16 PROCEDURE — 92526 ORAL FUNCTION THERAPY: CPT

## 2017-01-16 PROCEDURE — C9113 INJ PANTOPRAZOLE SODIUM, VIA: HCPCS | Performed by: PHYSICIAN ASSISTANT

## 2017-01-16 PROCEDURE — 80048 BASIC METABOLIC PNL TOTAL CA: CPT | Performed by: HOSPITALIST

## 2017-01-16 PROCEDURE — 94760 N-INVAS EAR/PLS OXIMETRY 1: CPT

## 2017-01-16 RX ORDER — HALOPERIDOL 5 MG/ML
1 INJECTION INTRAMUSCULAR EVERY 6 HOURS PRN
Status: DISCONTINUED | OUTPATIENT
Start: 2017-01-16 | End: 2017-01-19 | Stop reason: HOSPADM

## 2017-01-16 RX ADMIN — PANTOPRAZOLE SODIUM 40 MG: 40 INJECTION, POWDER, FOR SOLUTION INTRAVENOUS at 09:09

## 2017-01-16 RX ADMIN — BRIMONIDINE TARTRATE 1 DROP: 1.5 SOLUTION OPHTHALMIC at 09:08

## 2017-01-16 RX ADMIN — BIMATOPROST 1 DROP: 0.1 SOLUTION/ DROPS OPHTHALMIC at 21:36

## 2017-01-16 RX ADMIN — VANCOMYCIN HYDROCHLORIDE 125 MG: 500 INJECTION, POWDER, LYOPHILIZED, FOR SOLUTION INTRAVENOUS at 06:16

## 2017-01-16 RX ADMIN — ACETAMINOPHEN 650 MG: 160 SUSPENSION ORAL at 11:48

## 2017-01-16 RX ADMIN — VANCOMYCIN HYDROCHLORIDE 125 MG: 500 INJECTION, POWDER, LYOPHILIZED, FOR SOLUTION INTRAVENOUS at 11:48

## 2017-01-16 RX ADMIN — LEVOTHYROXINE SODIUM 75 MCG: 75 TABLET ORAL at 06:16

## 2017-01-16 RX ADMIN — VANCOMYCIN HYDROCHLORIDE 125 MG: 500 INJECTION, POWDER, LYOPHILIZED, FOR SOLUTION INTRAVENOUS at 18:37

## 2017-01-16 RX ADMIN — VANCOMYCIN HYDROCHLORIDE 125 MG: 500 INJECTION, POWDER, LYOPHILIZED, FOR SOLUTION INTRAVENOUS at 23:47

## 2017-01-16 RX ADMIN — LORATADINE 5 MG: 10 TABLET ORAL at 09:09

## 2017-01-16 RX ADMIN — BRIMONIDINE TARTRATE 1 DROP: 1.5 SOLUTION OPHTHALMIC at 18:37

## 2017-01-16 RX ADMIN — CARVEDILOL 12.5 MG: 12.5 TABLET, FILM COATED ORAL at 09:09

## 2017-01-16 RX ADMIN — GABAPENTIN 200 MG: 100 CAPSULE ORAL at 09:09

## 2017-01-16 RX ADMIN — PRAVASTATIN SODIUM 80 MG: 80 TABLET ORAL at 16:57

## 2017-01-16 RX ADMIN — CARVEDILOL 12.5 MG: 12.5 TABLET, FILM COATED ORAL at 16:57

## 2017-01-16 RX ADMIN — VALPROIC ACID 125 MG: 250 SOLUTION ORAL at 11:48

## 2017-01-16 RX ADMIN — PANTOPRAZOLE SODIUM 40 MG: 40 INJECTION, POWDER, FOR SOLUTION INTRAVENOUS at 21:36

## 2017-01-16 RX ADMIN — GABAPENTIN 200 MG: 100 CAPSULE ORAL at 18:37

## 2017-01-16 RX ADMIN — POTASSIUM CHLORIDE 40 MEQ: 20 SOLUTION ORAL at 18:37

## 2017-01-16 RX ADMIN — QUETIAPINE FUMARATE 12.5 MG: 25 TABLET, FILM COATED ORAL at 21:36

## 2017-01-16 RX ADMIN — CLOPIDOGREL BISULFATE 75 MG: 75 TABLET ORAL at 09:09

## 2017-01-16 RX ADMIN — ESCITALOPRAM OXALATE 10 MG: 10 TABLET ORAL at 09:09

## 2017-01-16 RX ADMIN — AMLODIPINE BESYLATE 5 MG: 5 TABLET ORAL at 09:09

## 2017-01-16 RX ADMIN — VALPROIC ACID 125 MG: 250 SOLUTION ORAL at 21:37

## 2017-01-16 RX ADMIN — TIOTROPIUM BROMIDE 18 MCG: 18 CAPSULE ORAL; RESPIRATORY (INHALATION) at 09:07

## 2017-01-16 RX ADMIN — POTASSIUM CHLORIDE 40 MEQ: 20 SOLUTION ORAL at 09:09

## 2017-01-17 LAB
ANION GAP SERPL CALCULATED.3IONS-SCNC: 8 MMOL/L (ref 4–13)
BUN SERPL-MCNC: 15 MG/DL (ref 5–25)
CALCIUM SERPL-MCNC: 9.2 MG/DL (ref 8.3–10.1)
CHLORIDE SERPL-SCNC: 106 MMOL/L (ref 100–108)
CO2 SERPL-SCNC: 27 MMOL/L (ref 21–32)
CREAT SERPL-MCNC: 0.8 MG/DL (ref 0.6–1.3)
GFR SERPL CREATININE-BSD FRML MDRD: >60 ML/MIN/1.73SQ M
GLUCOSE SERPL-MCNC: 143 MG/DL (ref 65–140)
POTASSIUM SERPL-SCNC: 4.5 MMOL/L (ref 3.5–5.3)
SODIUM SERPL-SCNC: 141 MMOL/L (ref 136–145)

## 2017-01-17 PROCEDURE — C9113 INJ PANTOPRAZOLE SODIUM, VIA: HCPCS | Performed by: PHYSICIAN ASSISTANT

## 2017-01-17 PROCEDURE — 94760 N-INVAS EAR/PLS OXIMETRY 1: CPT

## 2017-01-17 PROCEDURE — 80048 BASIC METABOLIC PNL TOTAL CA: CPT | Performed by: HOSPITALIST

## 2017-01-17 RX ORDER — CLONAZEPAM 0.5 MG/1
0.5 TABLET ORAL 2 TIMES DAILY
Status: DISCONTINUED | OUTPATIENT
Start: 2017-01-17 | End: 2017-01-19 | Stop reason: HOSPADM

## 2017-01-17 RX ADMIN — PANTOPRAZOLE SODIUM 40 MG: 40 INJECTION, POWDER, FOR SOLUTION INTRAVENOUS at 21:51

## 2017-01-17 RX ADMIN — BIMATOPROST 1 DROP: 0.1 SOLUTION/ DROPS OPHTHALMIC at 22:43

## 2017-01-17 RX ADMIN — GABAPENTIN 200 MG: 100 CAPSULE ORAL at 18:39

## 2017-01-17 RX ADMIN — QUETIAPINE FUMARATE 12.5 MG: 25 TABLET, FILM COATED ORAL at 22:43

## 2017-01-17 RX ADMIN — LEVOTHYROXINE SODIUM 75 MCG: 75 TABLET ORAL at 05:44

## 2017-01-17 RX ADMIN — POTASSIUM CHLORIDE 40 MEQ: 20 SOLUTION ORAL at 18:39

## 2017-01-17 RX ADMIN — PRAVASTATIN SODIUM 80 MG: 80 TABLET ORAL at 15:51

## 2017-01-17 RX ADMIN — BRIMONIDINE TARTRATE 1 DROP: 1.5 SOLUTION OPHTHALMIC at 09:31

## 2017-01-17 RX ADMIN — CARVEDILOL 12.5 MG: 12.5 TABLET, FILM COATED ORAL at 09:23

## 2017-01-17 RX ADMIN — VALPROIC ACID 125 MG: 250 SOLUTION ORAL at 09:26

## 2017-01-17 RX ADMIN — TIOTROPIUM BROMIDE 18 MCG: 18 CAPSULE ORAL; RESPIRATORY (INHALATION) at 09:26

## 2017-01-17 RX ADMIN — POTASSIUM CHLORIDE 40 MEQ: 20 SOLUTION ORAL at 09:26

## 2017-01-17 RX ADMIN — CARVEDILOL 12.5 MG: 12.5 TABLET, FILM COATED ORAL at 15:51

## 2017-01-17 RX ADMIN — AMLODIPINE BESYLATE 5 MG: 5 TABLET ORAL at 09:22

## 2017-01-17 RX ADMIN — CLOPIDOGREL BISULFATE 75 MG: 75 TABLET ORAL at 09:22

## 2017-01-17 RX ADMIN — LORATADINE 5 MG: 10 TABLET ORAL at 09:22

## 2017-01-17 RX ADMIN — BRIMONIDINE TARTRATE 1 DROP: 1.5 SOLUTION OPHTHALMIC at 18:39

## 2017-01-17 RX ADMIN — VANCOMYCIN HYDROCHLORIDE 125 MG: 500 INJECTION, POWDER, LYOPHILIZED, FOR SOLUTION INTRAVENOUS at 05:44

## 2017-01-17 RX ADMIN — VANCOMYCIN HYDROCHLORIDE 125 MG: 500 INJECTION, POWDER, LYOPHILIZED, FOR SOLUTION INTRAVENOUS at 12:04

## 2017-01-17 RX ADMIN — PANTOPRAZOLE SODIUM 40 MG: 40 INJECTION, POWDER, FOR SOLUTION INTRAVENOUS at 09:23

## 2017-01-17 RX ADMIN — CLONAZEPAM 0.5 MG: 0.5 TABLET ORAL at 18:39

## 2017-01-17 RX ADMIN — ESCITALOPRAM OXALATE 10 MG: 10 TABLET ORAL at 09:23

## 2017-01-17 RX ADMIN — VANCOMYCIN HYDROCHLORIDE 125 MG: 500 INJECTION, POWDER, LYOPHILIZED, FOR SOLUTION INTRAVENOUS at 18:39

## 2017-01-17 RX ADMIN — NYSTATIN 500000 UNITS: 100000 SUSPENSION ORAL at 22:43

## 2017-01-17 RX ADMIN — VALPROIC ACID 125 MG: 250 SOLUTION ORAL at 21:51

## 2017-01-17 RX ADMIN — GABAPENTIN 200 MG: 100 CAPSULE ORAL at 09:23

## 2017-01-18 ENCOUNTER — APPOINTMENT (INPATIENT)
Dept: RADIOLOGY | Facility: HOSPITAL | Age: 82
DRG: 871 | End: 2017-01-18
Payer: MEDICARE

## 2017-01-18 PROCEDURE — 70450 CT HEAD/BRAIN W/O DYE: CPT

## 2017-01-18 PROCEDURE — C9113 INJ PANTOPRAZOLE SODIUM, VIA: HCPCS | Performed by: PHYSICIAN ASSISTANT

## 2017-01-18 PROCEDURE — 94760 N-INVAS EAR/PLS OXIMETRY 1: CPT | Performed by: SOCIAL WORKER

## 2017-01-18 PROCEDURE — 92526 ORAL FUNCTION THERAPY: CPT

## 2017-01-18 RX ADMIN — VANCOMYCIN HYDROCHLORIDE 125 MG: 500 INJECTION, POWDER, LYOPHILIZED, FOR SOLUTION INTRAVENOUS at 05:35

## 2017-01-18 RX ADMIN — PANTOPRAZOLE SODIUM 40 MG: 40 INJECTION, POWDER, FOR SOLUTION INTRAVENOUS at 21:45

## 2017-01-18 RX ADMIN — BRIMONIDINE TARTRATE 1 DROP: 1.5 SOLUTION OPHTHALMIC at 08:35

## 2017-01-18 RX ADMIN — ESCITALOPRAM OXALATE 10 MG: 10 TABLET ORAL at 08:33

## 2017-01-18 RX ADMIN — PRAVASTATIN SODIUM 80 MG: 80 TABLET ORAL at 17:38

## 2017-01-18 RX ADMIN — BRIMONIDINE TARTRATE 1 DROP: 1.5 SOLUTION OPHTHALMIC at 17:38

## 2017-01-18 RX ADMIN — CLONAZEPAM 0.5 MG: 0.5 TABLET ORAL at 17:38

## 2017-01-18 RX ADMIN — NYSTATIN 500000 UNITS: 100000 SUSPENSION ORAL at 21:44

## 2017-01-18 RX ADMIN — QUETIAPINE FUMARATE 12.5 MG: 25 TABLET, FILM COATED ORAL at 22:11

## 2017-01-18 RX ADMIN — VALPROIC ACID 125 MG: 250 SOLUTION ORAL at 21:44

## 2017-01-18 RX ADMIN — VALPROIC ACID 125 MG: 250 SOLUTION ORAL at 08:35

## 2017-01-18 RX ADMIN — LEVOTHYROXINE SODIUM 75 MCG: 75 TABLET ORAL at 05:35

## 2017-01-18 RX ADMIN — NYSTATIN 500000 UNITS: 100000 SUSPENSION ORAL at 08:33

## 2017-01-18 RX ADMIN — GABAPENTIN 200 MG: 100 CAPSULE ORAL at 08:32

## 2017-01-18 RX ADMIN — CARVEDILOL 12.5 MG: 12.5 TABLET, FILM COATED ORAL at 08:33

## 2017-01-18 RX ADMIN — VANCOMYCIN HYDROCHLORIDE 125 MG: 500 INJECTION, POWDER, LYOPHILIZED, FOR SOLUTION INTRAVENOUS at 12:08

## 2017-01-18 RX ADMIN — TIOTROPIUM BROMIDE 18 MCG: 18 CAPSULE ORAL; RESPIRATORY (INHALATION) at 08:35

## 2017-01-18 RX ADMIN — LORATADINE 5 MG: 10 TABLET ORAL at 08:32

## 2017-01-18 RX ADMIN — CLOPIDOGREL BISULFATE 75 MG: 75 TABLET ORAL at 08:32

## 2017-01-18 RX ADMIN — CARVEDILOL 12.5 MG: 12.5 TABLET, FILM COATED ORAL at 17:38

## 2017-01-18 RX ADMIN — VANCOMYCIN HYDROCHLORIDE 125 MG: 500 INJECTION, POWDER, LYOPHILIZED, FOR SOLUTION INTRAVENOUS at 17:40

## 2017-01-18 RX ADMIN — VANCOMYCIN HYDROCHLORIDE 125 MG: 500 INJECTION, POWDER, LYOPHILIZED, FOR SOLUTION INTRAVENOUS at 00:47

## 2017-01-18 RX ADMIN — NYSTATIN 500000 UNITS: 100000 SUSPENSION ORAL at 12:08

## 2017-01-18 RX ADMIN — AMLODIPINE BESYLATE 5 MG: 5 TABLET ORAL at 08:32

## 2017-01-18 RX ADMIN — PANTOPRAZOLE SODIUM 40 MG: 40 INJECTION, POWDER, FOR SOLUTION INTRAVENOUS at 08:33

## 2017-01-18 RX ADMIN — GABAPENTIN 200 MG: 100 CAPSULE ORAL at 17:38

## 2017-01-18 RX ADMIN — NYSTATIN 500000 UNITS: 100000 SUSPENSION ORAL at 17:38

## 2017-01-18 RX ADMIN — CLONAZEPAM 0.5 MG: 0.5 TABLET ORAL at 08:33

## 2017-01-18 RX ADMIN — BIMATOPROST 1 DROP: 0.1 SOLUTION/ DROPS OPHTHALMIC at 21:43

## 2017-01-19 VITALS
BODY MASS INDEX: 20.62 KG/M2 | DIASTOLIC BLOOD PRESSURE: 84 MMHG | SYSTOLIC BLOOD PRESSURE: 131 MMHG | WEIGHT: 116.4 LBS | OXYGEN SATURATION: 95 % | HEIGHT: 63 IN | TEMPERATURE: 98.3 F | RESPIRATION RATE: 16 BRPM | HEART RATE: 95 BPM

## 2017-01-19 PROBLEM — N17.9 AKI (ACUTE KIDNEY INJURY) (HCC): Status: RESOLVED | Noted: 2017-01-10 | Resolved: 2017-01-19

## 2017-01-19 PROCEDURE — C9113 INJ PANTOPRAZOLE SODIUM, VIA: HCPCS | Performed by: PHYSICIAN ASSISTANT

## 2017-01-19 PROCEDURE — 92526 ORAL FUNCTION THERAPY: CPT

## 2017-01-19 RX ORDER — CLONAZEPAM 0.5 MG/1
0.5 TABLET ORAL 2 TIMES DAILY
Qty: 20 TABLET | Refills: 0 | Status: SHIPPED | OUTPATIENT
Start: 2017-01-19

## 2017-01-19 RX ADMIN — TIOTROPIUM BROMIDE 18 MCG: 18 CAPSULE ORAL; RESPIRATORY (INHALATION) at 10:35

## 2017-01-19 RX ADMIN — PANTOPRAZOLE SODIUM 40 MG: 40 INJECTION, POWDER, FOR SOLUTION INTRAVENOUS at 10:33

## 2017-01-19 RX ADMIN — ESCITALOPRAM OXALATE 10 MG: 10 TABLET ORAL at 10:34

## 2017-01-19 RX ADMIN — LEVOTHYROXINE SODIUM 75 MCG: 75 TABLET ORAL at 06:49

## 2017-01-19 RX ADMIN — VALPROIC ACID 125 MG: 250 SOLUTION ORAL at 10:36

## 2017-01-19 RX ADMIN — VANCOMYCIN HYDROCHLORIDE 125 MG: 500 INJECTION, POWDER, LYOPHILIZED, FOR SOLUTION INTRAVENOUS at 06:49

## 2017-01-19 RX ADMIN — NYSTATIN 500000 UNITS: 100000 SUSPENSION ORAL at 12:00

## 2017-01-19 RX ADMIN — CARVEDILOL 12.5 MG: 12.5 TABLET, FILM COATED ORAL at 06:49

## 2017-01-19 RX ADMIN — NYSTATIN 500000 UNITS: 100000 SUSPENSION ORAL at 10:34

## 2017-01-19 RX ADMIN — CLONAZEPAM 0.5 MG: 0.5 TABLET ORAL at 10:34

## 2017-01-19 RX ADMIN — BRIMONIDINE TARTRATE 1 DROP: 1.5 SOLUTION OPHTHALMIC at 10:37

## 2017-01-19 RX ADMIN — AMLODIPINE BESYLATE 5 MG: 5 TABLET ORAL at 10:34

## 2017-01-19 RX ADMIN — GABAPENTIN 200 MG: 100 CAPSULE ORAL at 10:34

## 2017-01-19 RX ADMIN — VANCOMYCIN HYDROCHLORIDE 125 MG: 500 INJECTION, POWDER, LYOPHILIZED, FOR SOLUTION INTRAVENOUS at 12:00

## 2017-01-19 RX ADMIN — LORATADINE 5 MG: 10 TABLET ORAL at 10:33

## 2017-01-19 RX ADMIN — CLOPIDOGREL BISULFATE 75 MG: 75 TABLET ORAL at 10:34

## 2017-01-19 RX ADMIN — VANCOMYCIN HYDROCHLORIDE 125 MG: 500 INJECTION, POWDER, LYOPHILIZED, FOR SOLUTION INTRAVENOUS at 00:50

## 2018-01-12 NOTE — MISCELLANEOUS
History of Present Illness  COPD Hospital Discharge Initial Follow-Up Call: The patient is being contacted for follow-up after hospitalization  The date of discharge is 11/17/16  I spoke with Boston Bolaños RN at [facility]  The patient was discharged to [facility]  The patient is a former smoker with a 68 pack year history  The patient quit smoking in 2014  The patient is experiencing the following symptoms: no wheezing, no cough, no dyspnea, no fever and not coughing up sputum  Pulse oximetry 95%  Zone tool status is yellow  The following topics were reviewed:  rescue vs  maintenance inhalers and medication compliance  Narrative Summary:    Spoke with Boston Bolaños RN at [facility]  Patient had 2 hospitalizations back to [facility] and returned to  for rehab  She may become a permanent resident, depending on her recovery, ability to increase independence  She is taking meds as prescribed, and being evaluated by RT to determine best course of medication delivery  No problems at this time  No distress noted, occasional dry cough  Current Meds    1  Levocetirizine Dihydrochloride 5 MG Oral Tablet; take one tablet by mouth every day; Therapy: 51ISU7865 to (Evaluate:19May2017)  Requested for: 86Ige3318; Last   Rx:24May2016 Ordered    2  AmLODIPine Besylate 5 MG Oral Tablet; Take 1 tablet daily; Therapy: 97Bwb2939 to (Evaluate:27Nov2016)  Requested for: 45YZC7177; Last   Rx:31May2016 Ordered   3  Carvedilol 12 5 MG Oral Tablet; TAKE 1 TABLET Twice daily with food; Therapy: 52FUA8770 to (Evaluate:25Mar2017)  Requested for: 12AVU8517; Last   Rx:30Mar2016 Ordered    4  Incruse Ellipta 62 5 MCG/INH Inhalation Aerosol Powder Breath Activated; INHALE 1   PUFF ONCE DAILY; Therapy: 30GDC0027 to (Bernardo Monroe)  Requested for: 42Sax4083; Last   Rx:26Aug2016 Ordered   5   Ipratropium-Albuterol 0 5-2 5 (3) MG/3ML Inhalation Solution; INHALE 1 VIAL VIA   NEBULIZER 4 TIMES DAILY; Therapy: 36BEF4995 to (Monica Clark)  Requested for: 52Vhr0270; Last   Rx:15Tex2543 Ordered    6  Escitalopram Oxalate 10 MG Oral Tablet; Take 1 tablet daily; Therapy: 99FNQ2372 to (Evaluate:49Ixy9541)  Requested for: 83Fjb6434; Last   Rx:23Axu9126 Ordered    7  Simvastatin 40 MG Oral Tablet; Take 1 tablet daily; Therapy: 98Wjq8413 to (Evaluate:11Mar2017)  Requested for: 50WCZ5424; Last   Rx:16Mar2016 Ordered    8  Levothyroxine Sodium 75 MCG Oral Tablet; Take 1 tablet daily; Therapy: 13CEW8590 to (Evaluate:17Jun2017)  Requested for: 48VLK2613; Last   Rx:22Jun2016 Ordered    9  Famotidine 20 MG (Dis) TABS; Take 1 tablet twice daily; Therapy: (Recorded:39Mar8193) to Recorded    10  Clopidogrel Bisulfate 75 MG Oral Tablet; Take 1 tablet daily; Therapy: 05IRE5983 to (Evaluate:51Ibi7231)  Requested for: 10Aug2016; Last    Rx:10Aug2016 Ordered    11  Meclizine HCl - 25 MG Oral Tablet; Take one tablet every 8 hours as needed for dizziness; Therapy: 78NBJ6008 to (Evaluate:68Zuv4593)  Requested for: 23Oct2016; Last    Rx:23Oct2016 Ordered    12  Alphagan P 0 1 % Ophthalmic Solution; 1 drop in OS BID; Therapy: (Recorded:08Nov2016) to Recorded   13  Citracal/Vitamin D TABS; One tablet daily Recorded   14  [employer] Vitamin D3 1000 UNIT CAPS; Take as directed Recorded   15  Dulcolax SUPP; 1 supp rectally q 72 hrs prn; Therapy: (Recorded:08Nov2016) to Recorded   16  Fleet Enema ENEM; 1 enema q 72 hrs prn; Therapy: (Recorded:08Nov2016) to Recorded   17  Gabapentin 100 MG Oral Capsule; take 1 capsule daily; Therapy: 38Bjq2130 to (Evaluate:86Xwg5908) Recorded   18  Lumigan 0 01 % Ophthalmic Solution; INSTILL 1 DROP INTO LEFT EYE ONCE DAILY IN    THE EVENING; Therapy: (Recorded:21Oct2013) to Recorded   19  MDL Milk of Magnesia SUSP; 30 ml q 72 hrs prn; Therapy: (Recorded:08Nov2016) to Recorded   20  Multi-Vitamin SOLN; SWALLOW 30 ML Daily; Therapy: (Recorded:08Nov2016) to Recorded   21   Nystatin SUSP; 970775 , give 4 ml QID swish NO swallow; Therapy: (Recorded:08Nov2016) to Recorded   22  Proventil  (90 Base) MCG/ACT Inhalation Aerosol Solution; USE 2 PUFFS EVERY    6 HOURS AS DIRECTED; Therapy: (Recorded:08Nov2016) to Recorded   23  Reclast 5 MG/100ML Intravenous Solution; USE AS DIRECTED Recorded   24  Spiriva HandiHaler 18 MCG Inhalation Capsule; INHALE CONTENTS OF CAPSULE    ONCE DAY; Therapy: (Recorded:08Nov2016) to Recorded   25  Tylenol 325 MG Oral Tablet; TAKE 2 TABLET Every 4 hours PRN; Therapy: (Recorded:08Nov2016) to Recorded    Allergies    1  Morphine Sulfate SOLN   2  Aspirin CHEW   3  Heparin Sodium (Porcine) SOLN    Future Appointments    Date/Time Provider Specialty Site   01/24/2017 10:30 AM Sushant Jack DO Family Medicine [address] [facility]     Signatures   Electronically signed by :  Jessica De Luna RT; Nov 23 2016  1:24PM EST                       (Author)

## 2018-01-12 NOTE — RESULT NOTES
Verified Results  (1) COMPREHENSIVE METABOLIC PANEL [id number] 15:24KI Lorna Moran Order Number: [id number]  TW Order Number: [id number] Order Number: [id number] Order Number: [id number]     Test Name Result Flag Reference   GLUCOSE,RANDM 101 mg/dL     If the patient is fasting, the ADA then defines impaired fasting glucose as > 100 mg/dL and diabetes as > or equal to 123 mg/dL  SODIUM 141 mmol/L  136-145   POTASSIUM 3 9 mmol/L  3 5-5 3   CHLORIDE 104 mmol/L  100-108   CARBON DIOXIDE 31 mmol/L  21-32   ANION GAP (CALC) 6 mmol/L  4-13   BLOOD UREA NITROGEN 16 mg/dL  5-25   CREATININE 0 92 mg/dL  0 60-1 30   Standardized to IDMS reference method   CALCIUM 9 4 mg/dL  8 3-10 1   BILI, TOTAL 0 47 mg/dL  0 20-1 00   ALK PHOSPHATAS 69 U/L     ALT (SGPT) 17 U/L  12-78   AST(SGOT) 20 U/L  5-45   ALBUMIN 3 3 g/dL L 3 5-5 0   TOTAL PROTEIN 7 1 g/dL  6 4-8 2   eGFR Non-African American 57 6 ml/min/1 73sq [facility] [employer] Disease Education Program recommendations are as follows:  GFR calculation is accurate only with a steady state creatinine  Chronic Kidney disease less than 60 ml/min/1 73 sq  meters  Kidney failure less than 15 ml/min/1 73 sq  meters       (1) LIPID PANEL FASTING W DIRECT LDL REFLEX 66JAV6037 09:49AM Lorna Moran Order Number: [id number]  TW Order Number: [id number] Order Number: [id number] Order Number: [id number]     Test Name Result Flag Reference   CHOLESTEROL 133 mg/dL     LDL CHOLESTEROL CALCULATED 49 mg/dL  0-100   Triglyceride:         Normal              <150 mg/dl       Borderline High    150-199 mg/dl       High               200-499 mg/dl       Very High          >499 mg/dl  Cholesterol:         Desirable        <200 mg/dl      Borderline High  200-239 mg/dl      High             >239 mg/dl  HDL Cholesterol:        High    >59 mg/dL      Low     <41 mg/dL  LDL Cholesterol:        Optimal          <100 mg/dl        Near Optimal     100-129 mg/dl        Above Optimal          Borderline High   130-159 mg/dl          High              160-189 mg/dl          Very High        >189 mg/dl  LDL CALCULATED:    This screening LDL is a calculated result  It does not have the accuracy of the Direct Measured LDL in the monitoring of patients with hyperlipidemia and/or statin therapy  Direct Measure LDL (LXW264) must be ordered separately in these patients  TRIGLYCERIDES 138 mg/dL  <=150   Specimen collection should occur prior to N-Acetylcysteine or Metamizole administration due to the potential for falsely depressed results  HDL,DIRECT 56 mg/dL  40-60   Specimen collection should occur prior to Metamizole administration due to the potential for falsely depressed results  (1) TSH WITH FT4 REFLEX 67Gkb9304 09:49AM Bernardino Verduzco Order Number: [id number]   Order Number: [id number] Order Number: [id number] Order Number: [id number]     Test Name Result Flag Reference   TSH 1 210 uIU/mL  0 358-3 740   Patients undergoing fluorescein dye angiography may retain small amounts of fluorescein in the body for 48-72 hours post procedure  Samples containing fluorescein can produce falsely depressed TSH values  If the patient had this procedure,a specimen should be resubmitted post fluorescein clearance  The recommended reference ranges for TSH during pregnancy are as follows:  First trimester 0 1 to 2 5 uIU/mL  Second trimester  0 2 to 3 0 uIU/mL  Third trimester 0 3 to 3 0 uIU/m       Discussion/Summary   VERY GOOD     Bree Prieto

## 2018-01-12 NOTE — PROCEDURES
Procedures by BIBI Kellogg at 12/12/2016  12:45 PM      Author:  BIBI Kellogg Service:  (none) Author Type:  Speech and Language Pathologist     Filed:  12/12/2016  1:39 PM Date of Service:  12/12/2016 12:45 PM Status:  Signed     :  BIBI Kellogg (Speech and Language Pathologist)         Pre-procedure Diagnoses:       1  Throat cancer [C14 0]       2  Oropharyngeal dysphagia [R13 12]                Post-procedure Diagnoses:       1  Oropharyngeal dysphagia [R13 12]                Procedures:       1  FL BARIUM Doralee Nancy SPEECH [ULT765 (Custom)]                                                      Video Swallow Study      Patient Name: Radha Dumont  XVUDN'[id number] Date: 12/12/2016  par  par  Past Medical History  Past Medical History      Diagnosis   Date    Allergic rhinitis      Anxiety      Arthritis      Asthma      CAD (coronary artery disease)      Cardiac disease      COPD (chronic obstructive pulmonary disease)      Depression      Dysphagia      GERD (gastroesophageal reflux disease)      Glaucoma      Hearing loss      HTN (hypertension)      Hyperlipidemia      Hypothyroidism      Lung cancer       left lung, throat cancer     Macular degeneration, bilateral      Osteoporosis      Osteoporosis      Peripheral neuropathy      Throat cancer      TIA (transient ischemic attack)      Vertigo      Vision loss      Vitamin D deficiency          Past Surgical History  Past Surgical History       Procedure   Laterality Date    Appendectomy       Back surgery       Eye surgery       Joint replacement       Cataract extraction, bilateral       Lung lobectomy        left upper lobectomy and mid lobe wedge resection 2010      Cholecystectomy       Laminectomy       Abdominal surgery       Thyroidectomy        Ms Nguyen Stinson is an 79 y/o f referred for a VBS to assess her swallow function & ability to eat  She has been NPO for several months    The pt has a desire for some po, c/o dry mouth & pain 2* dryness  Her family is present w/ her today & state they feel she  will have improved quality of life  Previous VBS:  Last one was June 2016, the pt had done quite well at that time  However has had a decline since then w/ increased dysphagia, poor vocal quality & need for PEG tube for nutrition  Current Diet:   NPO, PEG as 1*  Premorbid diet:  Regular w/ thin  Dentition:  Upper dentures, no lowers  O2 requirement:  RA  Oral mech:  Strength and ROM: WFL, Full ROM    Vocal Quality/Speech:  Dtr reports improvement however there is vocal quality hoarseness  She is quite [facility], needs to have the speaker close to her & increased volume  Cognitive status:  Alert, cooperative for study  Consistencies administered: Barium laden applesauce, banana,  honey thick by tsp, cup, nectar thick by tsp, cup, straw, thin liquids by tsp, cup, straw, 13mm barium pill  Liquids were administered by cup and straw  Pt was seated laterally at 90 degrees  Oral stage:    Lip closure: functional  Mastication: reduced, anterior for small amt soft solid  Bolus formation:decreased  Bolus control: slow, wfl  Transfer: Slow, piecemeal  Residue: mild lingual coating w/ solids, puree  Adequate retrieval, anterior oral holding w/ piecemeal transfer of material   Slow A-P transfers w/ mild lingual coating following the swallow  Anterior mastication of the solids  Reduced lingual propulsion      Pharyngeal stage:    Swallow promptness: min delay  Spill to valleculae: w/ all   Spill to pyriforms:  Epiglottic inversion: reduced, 2* radiologic changes, mild undercoating inconsistently w/ varied material  Laryngeal rise: reduced both anteriorly & superiorly  Pharyngeal constriction: reduced to protect during the swallow- jaclyn w/ thinner material  Vallecular retention: none  Pyriform retention: none  PPW coating: trace  Osteophytes: none  CP prominence:-    Transient penetration: w/ cup sip thin, & cup sip nt  Epiglottic undercoat: mild w/ thin by cup  Penetration: -  Aspiration: sienna, immed aspiration during the swallow w/ straw sip thin  Mild amt but pt had immed cough response  Strategies: attempted head down, prep set  Pt w/ difficulty using prep set, head down does not dramatically change the swallow  Response to aspiration: Immediate strong cough    Screening of Esophageal stage:  Noted curved spine & curved esophagus w/ poorly clearing upper esophagus  The esophagus was coated following the swallow study but no gross stasis  Summary:   Pt presents w/ mild/mod oropharyngeal dysphagia mary by decreased bolus manipulation & transfer, reduced pharyngeal constriction & airway protection during the swallow w/ noted structural changes to the pharynx  Aspiration noted during the  swallow w/ thin  Recommendations:  Begin trials of pleasure feeds w/ SLP at facility  Diet: puree  Liquids: nt by tsp or cup in small sips  Meds: continue via PEG  Strategies: alternate sips w/ puree  F/u ST tx: yes  Full Supervision  Aspiration Precautions  Reflux Precautions  Results reviewed with: Pt family    Thank you for this consult  Please feel free to call with any questions    Robbin Puente MS CCC/SLP  [phone]           Received for:Provider  EPIC   Dec 12 2016  1:39PM [facility] Standard Time

## 2018-01-12 NOTE — PROCEDURES
Procedures by BIBI Garrison at 6/14/2016   9:10 AM      Author:  BIBI Garrison Service:  (none) Author Type:  Speech and Language Pathologist     Filed:  6/14/2016 10:39 AM Date of Service:  6/14/2016  9:10 AM Status:  Signed     :  BIBI Garrison (Speech and Language Pathologist)         Pre-procedure Diagnoses:       1  Pharyngeal dysphagia [R13 13]       2  Chronic GERD [K21 9]                Procedures:       1  FL BARIUM Alean Flatter SPEECH [ASK563 (Custom)]                                                      Video Swallow Study      Patient Name: Tammie GORDON Date: 6/14/2016  par  par  Past Medical History  Past Medical History      Diagnosis   Date    Arthritis      CAD (coronary artery disease)      Cardiac disease      COPD (chronic obstructive pulmonary disease)      Depression      GERD (gastroesophageal reflux disease)      Glaucoma      HTN (hypertension)      Hyperlipidemia      Hypothyroidism      Lung cancer       left lung     Macular degeneration, bilateral      Osteoporosis      TIA (transient ischemic attack)      Vertigo      Vision loss       par  Past Surgical History  Past Surgical History       Procedure   Laterality Date    Appendectomy       Back surgery       Eye surgery       Joint replacement       Cataract extraction, bilateral       Lung lobectomy        left upper lobectomy and mid lobe wedge resection 2010      Cholecystectomy       Laminectomy       Abdominal surgery       Thyroidectomy            General Information:    81 yo female referred to [facility]  for a VBS by Dr La Nena Morales for dysphagia w/  c/o occas choking w/ liquids  Cognition:  Pleasant, cooperative, [place], limited vision  Speech/Swallow Mech: Oral motor movements appeared WNL; Voice appeared full and somewhat hoarse  Dentition was natural lower, upper dentures; Respiratory Status: WFL on room air;   Current diet: regular w/ thin liquids    Prior VBS none      Pt was seen in radiology for a Video Barium Swallow Study, seated in the upright position and viewed laterally with the following consistencies: puree, soft, solid, HTL, NTL, thin liquids by cup and straw, whole pill w/ water  Results are as follows:       Oral Stage:   Bolus retrieval, mastication, manipulation and oral control of all consistencies appeared WNL  Pharyngeal Stage:   Noted abnormal hyperdensities in pyriform sinuses prior to any po given  Review of previous diagnostics revealed that pt had CT soft tissue of neck w/ contrast 6/12/16 which showed:  Heterogeneously enhancing mass suggestive of head and neck  malignancy involving the epiglottis, vallecula, aryepiglottic folds and vocal cords, right greater than left  However, swallow initiation, epiglottic inversion, airway closure were WNL; no pharyngeal retention was observed  No laryngeal penetration or aspiration was noted  Esophageal Stage:   all consistencies cleared through esophagus w/o difficulty  Assessment Summary:    Oral and pharyngeal stages of swallowing appear WNL- no pharyngeal retention, laryngeal penetration or aspiration observed       Diagnosis/Prognosis:            Recommendations:    cont regular diet w/ thin liquids  meds as tolerated  F/u w/ ENT                             Received for:Provider  EPIC   Jun 14 2016 10:39AM [facility] Standard Time

## 2018-01-14 NOTE — RESULT NOTES
Verified Results  (1) CBC/PLT/DIFF [id number] 10:18AM [employer] Order Number: [id number]     Test Name Result Flag Reference   WBC COUNT 9 43 Thousand/uL  4 31-10 16   RBC COUNT 3 85 Million/uL  3 81-5 12   HEMOGLOBIN 12 3 g/dL  11 5-15 4   HEMATOCRIT 36 9 %  34 8-46  1   MCV 96 fL  82-98   MCH 31 9 pg  26 8-34 3   MCHC 33 3 g/dL  31 4-37 4   RDW 13 1 %  11 6-15 1   MPV 12 0 fL  8 9-12 7   PLATELET COUNT 313 Thousands/uL  149-390   nRBC AUTOMATED 0 /100 WBCs     NEUTROPHILS RELATIVE PERCENT 78 % H 43-75   LYMPHOCYTES RELATIVE PERCENT 10 % L 14-44   MONOCYTES RELATIVE PERCENT 11 %  4-12   EOSINOPHILS RELATIVE PERCENT 1 %  0-6   BASOPHILS RELATIVE PERCENT 0 %  0-1   NEUTROPHILS ABSOLUTE COUNT 7 31 Thousands/?L  1 85-7 62   LYMPHOCYTES ABSOLUTE COUNT 0 96 Thousands/?L  0 60-4 47   MONOCYTES ABSOLUTE COUNT 1 04 Thousand/?L  0 17-1 22   EOSINOPHILS ABSOLUTE COUNT 0 08 Thousand/?L  0 00-0 61   BASOPHILS ABSOLUTE COUNT 0 02 Thousands/?L  0 00-0 10   - Patient Instructions: This bloodwork is non-fasting  Please drink two glasses of water morning of bloodwork  - Patient Instructions: This bloodwork is non-fasting  Please drink two glasses of water morning of bloodwork       (1) APTT [id number] 10:18AM [employer] Order Number: [id number]     Test Name Result Flag Reference   PARTIAL THROMBOPLASTIN TIME 30 seconds  24-36   Therapeutic Heparin Range = 60-90 seconds     (1) PT WITH INR [id number] 10:18AM [employer] Order Number: [id number]     Test Name Result Flag Reference   INR 1 07  0 86-1 16   PT 14 0 seconds  12 0-14 3

## 2018-01-14 NOTE — RESULT NOTES
Verified Results  [facility] SPEECH [id number] 08:17AM Satya Sides Order Number: [id number]   Performing Comments: to liquids   - Patient Instructions: To schedule this appointment, please contact Central Scheduling at [phone]  Test Name Result Flag Reference   FL BARIUM SWALLOW VIDEO W SPEECH (Report)     VIDEO BARIUM SWALLOW     HISTORY: Dysphagia with liquids     COMPARISON: CT scan of neck from 6/12/2016     FLUOROSCOPY TIME: 3 9 minutes     IMAGES: A single Static image was saved in the AP projection  The remainder of the examination was recorded on DVD as per protocol for speech pathology department  IMPRESSION:   FINDINGS/IMPRESSION:     Video barium swallow was performed by the department of speech pathology utilizing food substances of various thickness  The patient was noted by the speech pathologist to have some hyperdensity projecting in the piriform sinus area during the examination  She stated that this did not seem to impact swallowing function but asked that I review the image  I believe that    some of the density probably represents superimposition of carotid artery calcifications, however, some of the visualized density is probably due to the patient's known mass based on the prior CT  Please refer to the specific details of the report from    the neck CT  Please refer to the speech pathology report for further details  Workstation performed: [id number]     Signed by:   Madhavi Beltrán MD   6/14/16   100   45   2 5     * CT SOFT TISSUE NECK W CONTRAST 12Jun2016 09:56AM [employer] DESAI Order Number: [id number]   Performing Comments: left side   - Patient Instructions: To schedule this appointment, please contact Central Scheduling at [phone]  Test Name Result Flag Reference   CT SOFT TISSUE NECK W CONTRAST (Report)     CT NECK WITH CONTRAST     INDICATION: Dysphagia with liquids       COMPARISON: 7/7/2015     TECHNIQUE: Contiguous 2 5 mm images were obtained through the neck after administration of intravenous contrast  In addition, sagittal and coronal reformatted images were submitted for interpretation  This examination, like all CT scans performed in    the [facility], was performed utilizing techniques to minimize radiation dose exposure, including the use of iterative reconstruction and automated exposure control  80 ml if iodinated contrast, Omnipaque 350, was injected    intravenously without immediate consequence  IMAGE QUALITY: Diagnostic  FINDINGS:     VISUALIZED BRAIN PARENCHYMA: Mild age-appropriate volume loss  Mild chronic microangiopathic change  VISUALIZED ORBITS AND PARANASAL SINUSES: Normal      NASAL CAVITY AND NASOPHARYNX: Deviation the nasal septum towards the left with a leftward spur  Unremarkable nasopharynx  ORAL CAVITY, OROPHARYNX AND LARYNX: Normal tongue musculature  Lingual tonsils are bilaterally symmetric  There is a hyperenhancing mass identified within the right vallecula extending to the base of the epiglottis and inferiorly within the    aryepiglottic folds and anterior aspect of the larynx  This involves the false and true vocal cords, right slightly greater than left, filling the piriform sinus on the right and resulting in mild airway compromise  This mass overall measures 3 8 cm in   craniocaudad dimension on sagittal reconstructions     Subglottic airway is unremarkable  THYROID GLAND: No normal thyroid tissue noted  PAROTID AND SUBMANDIBULAR GLANDS: Normal      LYMPH NODES: Small right anterior jugular chain nodes are identified which appears slightly heterogeneous and demonstrate mild hyperenhancement  The largest of these is a 1 6 cm node in craniocaudad dimension   Small left-sided anterior jugular chain    nodes which are not enlarged by CT criteria but demonstrate slight hypervascularity     VASCULAR STRUCTURES: Moderate atherosclerotic calcification of the aortic arch  Mild narrowing at the origin of the left common carotid artery  Mild subclavian atherosclerotic disease  There is calcification of the distal common carotid artery and    proximal cervical internal carotid artery, right more so than left with moderate narrowing of the right internal carotid artery origin  THORACIC INLET: 7 mm pulmonary nodule identified within the right upper lobe similar to CTA of the head and neck dated 2/17/2012  There is scarring identified within the left apex which appears grossly unchanged as well  Mild gaseous distention of the   esophagus with traction of the esophagus towards the left corresponding to the area of volume loss and scarring within the lung  This is unchanged from 2012  BONY STRUCTURES: Osteopenia the bony structures with cervical spondylitic degenerative change  Osteomalacia of the maxilla and mandible  Exaggerated thoracic kyphosis  Slight anterior subluxation of C2 upon C3 and C3 upon C4  IMPRESSION:     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

## 2018-01-18 NOTE — PROGRESS NOTES
Patient Health Assessment    Date:            08/19/2016  Blood Pressure:  126/64  Pulse:           61  Age:             88  Weight:          124 lbs  Height/Length:   5' 4"  Body Mass Index: 21 2  Provider:        JESSICA  Clinic:          [facility]    High Blood Pressure    Respiratory Problems    Thyroid Problems  Medications: Allergies:      aspirin  Since Last Visit: Medical Alert: No Change    Medications: No Change    Allergies:        No Change  Pain Scale Type: Numeric Pain ScalePain Level: 0  Description:      Pt presents for comprehensive exam with son  Pt has been referred from [facility] oncology for clearance prior to radiation tx of head and neck for  laryngeal squamous cell carcinoma  Obtained PM, pan, PA of amarilis anterior and  reviewed findings  Pt has splinted crowns #[id number]  Recommended ext prior to  radiation therapy  Explained to pt and son the need to extract remaining  teeth due to high risk of caries and subsequent 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  s/p radiation therapy  Per med list pt has been taking Reclast IV  Pt did not  receive this years dose due to cancer dx  Explained risk of MRONJ to patient  and son and offered option of hyperbaric oxygen treatment  Pt elects to forgo  HBO as to begin radiation tx as soon as possible  Explained to patient that she can not wear denture after the extraction to  allow full healing and reduce risk of MRONJ  After healing completed, will  begin fabrication of amarilis complete denture  Pt and son understand and consent  to tx  Left ambulatory and satisfied  Uploaded referral from onc to [facility] center  NV: remove FPD, extract 22-27    NNV: after healing complete, being fabrication of amarilis CD    JS/MQ    ----- Signed on Friday, August 19, 2016 at 4:58:42 PM  -----  ----- Provider: JOSE - Resident Two, Dentist -- [facility]: [facility] -----